# Patient Record
Sex: MALE | Race: WHITE | NOT HISPANIC OR LATINO | URBAN - METROPOLITAN AREA
[De-identification: names, ages, dates, MRNs, and addresses within clinical notes are randomized per-mention and may not be internally consistent; named-entity substitution may affect disease eponyms.]

---

## 2022-05-21 ENCOUNTER — INPATIENT HOSPITAL (OUTPATIENT)
Dept: URBAN - METROPOLITAN AREA HOSPITAL 93 | Facility: HOSPITAL | Age: 72
End: 2022-05-21

## 2022-05-21 DIAGNOSIS — R63.4 ABNORMAL WEIGHT LOSS: ICD-10-CM

## 2022-05-21 DIAGNOSIS — R10.13 EPIGASTRIC PAIN: ICD-10-CM

## 2022-05-21 DIAGNOSIS — D50.9 IRON DEFICIENCY ANEMIA, UNSPECIFIED: ICD-10-CM

## 2022-05-21 PROCEDURE — 99254 IP/OBS CNSLTJ NEW/EST MOD 60: CPT | Performed by: INTERNAL MEDICINE

## 2022-05-22 PROCEDURE — 99232 SBSQ HOSP IP/OBS MODERATE 35: CPT | Performed by: INTERNAL MEDICINE

## 2022-05-23 ENCOUNTER — INPATIENT HOSPITAL (OUTPATIENT)
Dept: URBAN - METROPOLITAN AREA HOSPITAL 93 | Facility: HOSPITAL | Age: 72
End: 2022-05-23

## 2022-05-23 DIAGNOSIS — R10.13 EPIGASTRIC PAIN: ICD-10-CM

## 2022-05-23 DIAGNOSIS — D50.9 IRON DEFICIENCY ANEMIA, UNSPECIFIED: ICD-10-CM

## 2022-05-23 DIAGNOSIS — R63.4 ABNORMAL WEIGHT LOSS: ICD-10-CM

## 2022-05-23 PROCEDURE — 45380 COLONOSCOPY AND BIOPSY: CPT | Performed by: INTERNAL MEDICINE

## 2022-05-23 PROCEDURE — 43239 EGD BIOPSY SINGLE/MULTIPLE: CPT | Mod: 51 | Performed by: INTERNAL MEDICINE

## 2024-03-17 ENCOUNTER — INPATIENT (INPATIENT)
Facility: HOSPITAL | Age: 74
LOS: 0 days | Discharge: LEFT BEFORE DISCHARGE | End: 2024-03-18
Attending: HOSPITALIST | Admitting: HOSPITALIST
Payer: MEDICAID

## 2024-03-17 VITALS
SYSTOLIC BLOOD PRESSURE: 133 MMHG | RESPIRATION RATE: 16 BRPM | TEMPERATURE: 99 F | DIASTOLIC BLOOD PRESSURE: 81 MMHG | WEIGHT: 128.97 LBS | OXYGEN SATURATION: 95 % | HEART RATE: 117 BPM

## 2024-03-17 DIAGNOSIS — I48.91 UNSPECIFIED ATRIAL FIBRILLATION: ICD-10-CM

## 2024-03-17 LAB
ADD ON TEST-SPECIMEN IN LAB: SIGNIFICANT CHANGE UP
ADD ON TEST-SPECIMEN IN LAB: SIGNIFICANT CHANGE UP
ALBUMIN SERPL ELPH-MCNC: 3.6 G/DL — SIGNIFICANT CHANGE UP (ref 3.3–5)
ALP SERPL-CCNC: 83 U/L — SIGNIFICANT CHANGE UP (ref 40–120)
ALT FLD-CCNC: 36 U/L — SIGNIFICANT CHANGE UP (ref 4–41)
ANION GAP SERPL CALC-SCNC: 12 MMOL/L — SIGNIFICANT CHANGE UP (ref 7–14)
ANION GAP SERPL CALC-SCNC: 14 MMOL/L — SIGNIFICANT CHANGE UP (ref 7–14)
APTT BLD: 30.9 SEC — SIGNIFICANT CHANGE UP (ref 24.5–35.6)
AST SERPL-CCNC: 71 U/L — HIGH (ref 4–40)
BASOPHILS # BLD AUTO: 0.02 K/UL — SIGNIFICANT CHANGE UP (ref 0–0.2)
BASOPHILS NFR BLD AUTO: 0.2 % — SIGNIFICANT CHANGE UP (ref 0–2)
BILIRUB SERPL-MCNC: 0.7 MG/DL — SIGNIFICANT CHANGE UP (ref 0.2–1.2)
BUN SERPL-MCNC: 12 MG/DL — SIGNIFICANT CHANGE UP (ref 7–23)
BUN SERPL-MCNC: 13 MG/DL — SIGNIFICANT CHANGE UP (ref 7–23)
CALCIUM SERPL-MCNC: 8.3 MG/DL — LOW (ref 8.4–10.5)
CALCIUM SERPL-MCNC: 9.3 MG/DL — SIGNIFICANT CHANGE UP (ref 8.4–10.5)
CHLORIDE SERPL-SCNC: 102 MMOL/L — SIGNIFICANT CHANGE UP (ref 98–107)
CHLORIDE SERPL-SCNC: 106 MMOL/L — SIGNIFICANT CHANGE UP (ref 98–107)
CO2 SERPL-SCNC: 20 MMOL/L — LOW (ref 22–31)
CO2 SERPL-SCNC: 20 MMOL/L — LOW (ref 22–31)
CREAT SERPL-MCNC: 0.89 MG/DL — SIGNIFICANT CHANGE UP (ref 0.5–1.3)
CREAT SERPL-MCNC: 0.9 MG/DL — SIGNIFICANT CHANGE UP (ref 0.5–1.3)
EGFR: 89 ML/MIN/1.73M2 — SIGNIFICANT CHANGE UP
EGFR: 89 ML/MIN/1.73M2 — SIGNIFICANT CHANGE UP
EOSINOPHIL # BLD AUTO: 0.08 K/UL — SIGNIFICANT CHANGE UP (ref 0–0.5)
EOSINOPHIL NFR BLD AUTO: 1 % — SIGNIFICANT CHANGE UP (ref 0–6)
GLUCOSE SERPL-MCNC: 111 MG/DL — HIGH (ref 70–99)
GLUCOSE SERPL-MCNC: 119 MG/DL — HIGH (ref 70–99)
HCT VFR BLD CALC: 41.2 % — SIGNIFICANT CHANGE UP (ref 39–50)
HGB BLD-MCNC: 13.3 G/DL — SIGNIFICANT CHANGE UP (ref 13–17)
IANC: 5.83 K/UL — SIGNIFICANT CHANGE UP (ref 1.8–7.4)
IMM GRANULOCYTES NFR BLD AUTO: 0.2 % — SIGNIFICANT CHANGE UP (ref 0–0.9)
INR BLD: 1.05 RATIO — SIGNIFICANT CHANGE UP (ref 0.85–1.18)
LYMPHOCYTES # BLD AUTO: 1.46 K/UL — SIGNIFICANT CHANGE UP (ref 1–3.3)
LYMPHOCYTES # BLD AUTO: 17.4 % — SIGNIFICANT CHANGE UP (ref 13–44)
MCHC RBC-ENTMCNC: 26.3 PG — LOW (ref 27–34)
MCHC RBC-ENTMCNC: 32.3 GM/DL — SIGNIFICANT CHANGE UP (ref 32–36)
MCV RBC AUTO: 81.4 FL — SIGNIFICANT CHANGE UP (ref 80–100)
MONOCYTES # BLD AUTO: 0.96 K/UL — HIGH (ref 0–0.9)
MONOCYTES NFR BLD AUTO: 11.5 % — SIGNIFICANT CHANGE UP (ref 2–14)
NEUTROPHILS # BLD AUTO: 5.83 K/UL — SIGNIFICANT CHANGE UP (ref 1.8–7.4)
NEUTROPHILS NFR BLD AUTO: 69.7 % — SIGNIFICANT CHANGE UP (ref 43–77)
NRBC # BLD: 0 /100 WBCS — SIGNIFICANT CHANGE UP (ref 0–0)
NRBC # FLD: 0 K/UL — SIGNIFICANT CHANGE UP (ref 0–0)
NT-PROBNP SERPL-SCNC: 260 PG/ML — SIGNIFICANT CHANGE UP
PLATELET # BLD AUTO: 213 K/UL — SIGNIFICANT CHANGE UP (ref 150–400)
POTASSIUM SERPL-MCNC: 4.1 MMOL/L — SIGNIFICANT CHANGE UP (ref 3.5–5.3)
POTASSIUM SERPL-MCNC: 5.5 MMOL/L — HIGH (ref 3.5–5.3)
POTASSIUM SERPL-SCNC: 4.1 MMOL/L — SIGNIFICANT CHANGE UP (ref 3.5–5.3)
POTASSIUM SERPL-SCNC: 5.5 MMOL/L — HIGH (ref 3.5–5.3)
PROT SERPL-MCNC: 7.8 G/DL — SIGNIFICANT CHANGE UP (ref 6–8.3)
PROTHROM AB SERPL-ACNC: 11.8 SEC — SIGNIFICANT CHANGE UP (ref 9.5–13)
RBC # BLD: 5.06 M/UL — SIGNIFICANT CHANGE UP (ref 4.2–5.8)
RBC # FLD: 15.3 % — HIGH (ref 10.3–14.5)
SODIUM SERPL-SCNC: 136 MMOL/L — SIGNIFICANT CHANGE UP (ref 135–145)
SODIUM SERPL-SCNC: 138 MMOL/L — SIGNIFICANT CHANGE UP (ref 135–145)
TROPONIN T, HIGH SENSITIVITY RESULT: 15 NG/L — SIGNIFICANT CHANGE UP
TROPONIN T, HIGH SENSITIVITY RESULT: 15 NG/L — SIGNIFICANT CHANGE UP
WBC # BLD: 8.37 K/UL — SIGNIFICANT CHANGE UP (ref 3.8–10.5)
WBC # FLD AUTO: 8.37 K/UL — SIGNIFICANT CHANGE UP (ref 3.8–10.5)

## 2024-03-17 PROCEDURE — 71275 CT ANGIOGRAPHY CHEST: CPT | Mod: 26,MC

## 2024-03-17 PROCEDURE — 74174 CTA ABD&PLVS W/CONTRAST: CPT | Mod: 26,MC

## 2024-03-17 PROCEDURE — 99223 1ST HOSP IP/OBS HIGH 75: CPT

## 2024-03-17 PROCEDURE — 71046 X-RAY EXAM CHEST 2 VIEWS: CPT | Mod: 26

## 2024-03-17 PROCEDURE — 99285 EMERGENCY DEPT VISIT HI MDM: CPT

## 2024-03-17 RX ORDER — SODIUM CHLORIDE 9 MG/ML
1000 INJECTION INTRAMUSCULAR; INTRAVENOUS; SUBCUTANEOUS ONCE
Refills: 0 | Status: COMPLETED | OUTPATIENT
Start: 2024-03-17 | End: 2024-03-17

## 2024-03-17 RX ORDER — ACETAMINOPHEN 500 MG
1000 TABLET ORAL ONCE
Refills: 0 | Status: COMPLETED | OUTPATIENT
Start: 2024-03-17 | End: 2024-03-17

## 2024-03-17 RX ORDER — ASPIRIN/CALCIUM CARB/MAGNESIUM 324 MG
324 TABLET ORAL ONCE
Refills: 0 | Status: COMPLETED | OUTPATIENT
Start: 2024-03-17 | End: 2024-03-17

## 2024-03-17 RX ORDER — FAMOTIDINE 10 MG/ML
20 INJECTION INTRAVENOUS ONCE
Refills: 0 | Status: COMPLETED | OUTPATIENT
Start: 2024-03-17 | End: 2024-03-17

## 2024-03-17 RX ADMIN — Medication 1000 MILLIGRAM(S): at 17:03

## 2024-03-17 RX ADMIN — FAMOTIDINE 20 MILLIGRAM(S): 10 INJECTION INTRAVENOUS at 19:27

## 2024-03-17 RX ADMIN — Medication 400 MILLIGRAM(S): at 19:28

## 2024-03-17 RX ADMIN — SODIUM CHLORIDE 1000 MILLILITER(S): 9 INJECTION INTRAMUSCULAR; INTRAVENOUS; SUBCUTANEOUS at 17:15

## 2024-03-17 RX ADMIN — Medication 324 MILLIGRAM(S): at 18:47

## 2024-03-17 NOTE — ED PROVIDER NOTE - CARE PLAN
Principal Discharge DX:	New onset atrial fibrillation  Secondary Diagnosis:	Chest pain  Secondary Diagnosis:	Shortness of breath   1

## 2024-03-17 NOTE — ED PROVIDER NOTE - CLINICAL SUMMARY MEDICAL DECISION MAKING FREE TEXT BOX
73-year-old male with past medical history emphysema, PUD, HLD, BPH presenting to the ER with chest pain and shortness of breath.  Patient's daughter-in-law is providing Urdu translation at bedside.  States last night patient developed left-sided chest pain with radiation to the back associated with shortness of breath.  On exam pt is well appearing,  bpm at triage, EKG with afib. Concern for aortic dissection, ACS. Pt with new onset a-fib. Plan: cbc/cmp, trop, bnp, CTa chest/abd/pelvis, cardiac monitor. 73-year-old male with past medical history emphysema, PUD, HLD, BPH presenting to the ER with chest pain and shortness of breath.  Patient's daughter-in-law is providing Danish translation at bedside.  States last night patient developed left-sided chest pain with radiation to the back associated with shortness of breath.  On exam pt is well appearing,  bpm at triage, EKG with afib. Concern for aortic dissection, ACS. Pt with new onset a-fib, HR  on cardiac monitor. Plan: cbc/cmp, trop, bnp, CTa chest/abd/pelvis, cardiac monitor.

## 2024-03-17 NOTE — ED ADULT NURSE REASSESSMENT NOTE - NS ED NURSE REASSESS COMMENT FT1
Pt is a&ox4, coherent, breathing spontaneously. Pt is fed and PO fluid offered by family. VS appreciated, HR 70. Pt is admitted pending bed assignment.
Pt is A&Ox4, coherent, breathing spontaneously, symmetrical unlabored. Family at bedside. Pt report partial relief of chest pain. Pt pending radiology result. Pt used urinal at bed side with standing assist x1. Plan of care in progress.

## 2024-03-17 NOTE — ED PROVIDER NOTE - IV ALTEPLASE INCLUSION HIDDEN
Outpatient Oncology Care Plan  Problem list:  knowledge deficit    Problems related to:    disease/disease progression    Interventions:  provided general teaching    Expected outcomes:  understands plan of care    Progress towards outcome:  making progres
show

## 2024-03-17 NOTE — ED ADULT TRIAGE NOTE - GLASGOW COMA SCALE: BEST MOTOR RESPONSE, MLM
Diagnosed with autism    Has had vomiting for a while  Had in the past wondered about EE.  Cyclic vomiting?  Will refer to GI   (M6) obeys commands

## 2024-03-17 NOTE — ED PROVIDER NOTE - OBJECTIVE STATEMENT
73-year-old male with past medical history emphysema, PUD, HLD, BPH presenting to the ER with chest pain and shortness of breath.  Patient's daughter-in-law is providing Korean translation at bedside.  States last night patient developed left-sided chest pain with radiation to the back associated with shortness of breath.  Patient reports he feels uncomfortable with exertion, unable to describe further.  Denies fever/chills, headache, dizziness, abdominal pain, N/V/D, diaphoresis, leg pain or swelling, recent travel or illness or any other concerns. 73-year-old male with past medical history emphysema, PUD, HLD, BPH presenting to the ER with chest pain and shortness of breath.  Patient's daughter-in-law is providing Pashto translation at bedside.  States last night patient developed left-sided chest pain with radiation to the back associated with shortness of breath.  Patient reports he feels uncomfortable with exertion, unable to describe further.  Of note pt was started on Azithromycin last week for cough, pt and family state he has a chronic cough, was not worsening. He followed up with his pulmonologist 2 days ago and was started on Trelegy. Denies fever/chills, headache, dizziness, abdominal pain, N/V/D, diaphoresis, leg pain or swelling, recent travel or illness or any other concerns.

## 2024-03-17 NOTE — ED ADULT NURSE REASSESSMENT NOTE - PAIN: RESPONSE TO INTERVENTIONS
Refill request for simvastatin routed to MD for approval.   Lipids done 2/02/22 -   Will be sent to [facility] when approved.   partial relief

## 2024-03-17 NOTE — ED PROVIDER NOTE - PROGRESS NOTE DETAILS
KAMRYN Kaufman: CTa without evidence of dissection, trop 15, rpt sent, K 5.5 but severely hemolyzed, rpt BMP sent. Pt reports improvement in symptoms, HR  on monitor in afib. Spoke with tele doc who accepts pt, pt and family aware of admission

## 2024-03-17 NOTE — ED ADULT NURSE NOTE - NSFALLRISKINTERV_ED_ALL_ED
Assistance OOB with selected safe patient handling equipment if applicable/Communicate fall risk and risk factors to all staff, patient, and family/Provide visual cue: yellow wristband, yellow gown, etc/Reinforce activity limits and safety measures with patient and family/Toileting schedule using arm’s reach rule for commode and bathroom/Call bell, personal items and telephone in reach/Instruct patient to call for assistance before getting out of bed/chair/stretcher/Non-slip footwear applied when patient is off stretcher/[place] to call system/Physically safe environment - no spills, clutter or unnecessary equipment/Purposeful Proactive Rounding/Room/bathroom lighting operational, light cord in reach

## 2024-03-17 NOTE — ED ADULT NURSE NOTE - OBJECTIVE STATEMENT
Pt received in bed coherent, breathing spontaneously, symmetrical, unlabored. Pt arrived ambulatory to ED accompanied by daughter for c/o SOB and chest pain 10/10 that radiates to back started last night. Pt denies fever, N/V. Pt with newly onset of Afib not currently on medication. Pt with Hx: emphysema. Labs drawn and sent pending result. 20 GIV PLACED IN RIGHT FOREARM. IV bolus fluid in progress. Plan of care in progress.

## 2024-03-17 NOTE — ED PROVIDER NOTE - ATTENDING APP SHARED VISIT CONTRIBUTION OF CARE
74 yo M hx HTN, HLD, PUD, BPH, presenting with complaints of chest pain, described as sharp and constant, that started last night. Pain worsened today, now radiating to the back. Denies associated nausea/vomiting or diaphoresis. No abdominal pain. No cough/fevers/chills. Denies focal weakness, numbness or tingling, but with generalized weakness. No history of similar symptoms in the past.    VITALS: reviewed  GEN: NAD, A & O x 4  HEAD/EYES: NCAT, EOMI, anicteric sclerae,   ENT: mucus membranes moist, oropharynx WNL, trachea midline,  RESP: lungs CTA with equal breath sounds bilaterally, chest wall nontender and atraumatic  CV: heart with reg rhythm S1, S2, distal pulses intact and symmetric bilaterally  ABDOMEN: normoactive bowel sounds, soft, nondistended, nontender, no palpable masses  : no CVAT  MSK: extremities atraumatic and nontender, no edema, no asymmetry.  SKIN: warm, dry, no rash, no bruising, no cyanosis. color appropriate for ethnicity  NEURO: alert, mentating appropriately, no facial asymmetry.   PSYCH: Affect appropriate    EKG shows AF, no known history, no AD. CP radiating to back, will obtain CTA r/o PE, labs with troponin r/o ACS, lipase r/o pancreatitis, will monitor and tele and tba

## 2024-03-18 VITALS
TEMPERATURE: 97 F | RESPIRATION RATE: 20 BRPM | SYSTOLIC BLOOD PRESSURE: 128 MMHG | DIASTOLIC BLOOD PRESSURE: 65 MMHG | OXYGEN SATURATION: 100 % | HEART RATE: 71 BPM

## 2024-03-18 DIAGNOSIS — R07.9 CHEST PAIN, UNSPECIFIED: ICD-10-CM

## 2024-03-18 DIAGNOSIS — K27.9 PEPTIC ULCER, SITE UNSPECIFIED, UNSPECIFIED AS ACUTE OR CHRONIC, WITHOUT HEMORRHAGE OR PERFORATION: ICD-10-CM

## 2024-03-18 DIAGNOSIS — I48.0 PAROXYSMAL ATRIAL FIBRILLATION: ICD-10-CM

## 2024-03-18 DIAGNOSIS — Z29.9 ENCOUNTER FOR PROPHYLACTIC MEASURES, UNSPECIFIED: ICD-10-CM

## 2024-03-18 DIAGNOSIS — R74.01 ELEVATION OF LEVELS OF LIVER TRANSAMINASE LEVELS: ICD-10-CM

## 2024-03-18 DIAGNOSIS — E78.5 HYPERLIPIDEMIA, UNSPECIFIED: ICD-10-CM

## 2024-03-18 DIAGNOSIS — J43.9 EMPHYSEMA, UNSPECIFIED: ICD-10-CM

## 2024-03-18 LAB
A1C WITH ESTIMATED AVERAGE GLUCOSE RESULT: 5.3 % — SIGNIFICANT CHANGE UP (ref 4–5.6)
ALBUMIN SERPL ELPH-MCNC: 3.5 G/DL — SIGNIFICANT CHANGE UP (ref 3.3–5)
ALP SERPL-CCNC: 81 U/L — SIGNIFICANT CHANGE UP (ref 40–120)
ALT FLD-CCNC: 28 U/L — SIGNIFICANT CHANGE UP (ref 4–41)
ANION GAP SERPL CALC-SCNC: 10 MMOL/L — SIGNIFICANT CHANGE UP (ref 7–14)
APTT BLD: 32 SEC — SIGNIFICANT CHANGE UP (ref 24.5–35.6)
AST SERPL-CCNC: 31 U/L — SIGNIFICANT CHANGE UP (ref 4–40)
BASOPHILS # BLD AUTO: 0.03 K/UL — SIGNIFICANT CHANGE UP (ref 0–0.2)
BASOPHILS NFR BLD AUTO: 0.4 % — SIGNIFICANT CHANGE UP (ref 0–2)
BILIRUB SERPL-MCNC: 0.7 MG/DL — SIGNIFICANT CHANGE UP (ref 0.2–1.2)
BUN SERPL-MCNC: 16 MG/DL — SIGNIFICANT CHANGE UP (ref 7–23)
CALCIUM SERPL-MCNC: 8.4 MG/DL — SIGNIFICANT CHANGE UP (ref 8.4–10.5)
CHLORIDE SERPL-SCNC: 109 MMOL/L — HIGH (ref 98–107)
CHOLEST SERPL-MCNC: 84 MG/DL — SIGNIFICANT CHANGE UP
CO2 SERPL-SCNC: 21 MMOL/L — LOW (ref 22–31)
CREAT SERPL-MCNC: 1.08 MG/DL — SIGNIFICANT CHANGE UP (ref 0.5–1.3)
EGFR: 72 ML/MIN/1.73M2 — SIGNIFICANT CHANGE UP
EOSINOPHIL # BLD AUTO: 0.16 K/UL — SIGNIFICANT CHANGE UP (ref 0–0.5)
EOSINOPHIL NFR BLD AUTO: 2 % — SIGNIFICANT CHANGE UP (ref 0–6)
ESTIMATED AVERAGE GLUCOSE: 105 — SIGNIFICANT CHANGE UP
GLUCOSE SERPL-MCNC: 95 MG/DL — SIGNIFICANT CHANGE UP (ref 70–99)
HBV CORE AB SER-ACNC: REACTIVE
HBV SURFACE AB SER-ACNC: REACTIVE
HCT VFR BLD CALC: 38.5 % — LOW (ref 39–50)
HCV AB S/CO SERPL IA: 0.1 S/CO — SIGNIFICANT CHANGE UP (ref 0–0.99)
HCV AB SERPL-IMP: SIGNIFICANT CHANGE UP
HDLC SERPL-MCNC: 29 MG/DL — LOW
HGB BLD-MCNC: 12.5 G/DL — LOW (ref 13–17)
IANC: 5.17 K/UL — SIGNIFICANT CHANGE UP (ref 1.8–7.4)
IMM GRANULOCYTES NFR BLD AUTO: 0.2 % — SIGNIFICANT CHANGE UP (ref 0–0.9)
INR BLD: 1.12 RATIO — SIGNIFICANT CHANGE UP (ref 0.85–1.18)
LIPID PNL WITH DIRECT LDL SERPL: 33 MG/DL — SIGNIFICANT CHANGE UP
LYMPHOCYTES # BLD AUTO: 1.74 K/UL — SIGNIFICANT CHANGE UP (ref 1–3.3)
LYMPHOCYTES # BLD AUTO: 21.5 % — SIGNIFICANT CHANGE UP (ref 13–44)
MCHC RBC-ENTMCNC: 26.6 PG — LOW (ref 27–34)
MCHC RBC-ENTMCNC: 32.5 GM/DL — SIGNIFICANT CHANGE UP (ref 32–36)
MCV RBC AUTO: 81.9 FL — SIGNIFICANT CHANGE UP (ref 80–100)
MONOCYTES # BLD AUTO: 0.96 K/UL — HIGH (ref 0–0.9)
MONOCYTES NFR BLD AUTO: 11.9 % — SIGNIFICANT CHANGE UP (ref 2–14)
NEUTROPHILS # BLD AUTO: 5.17 K/UL — SIGNIFICANT CHANGE UP (ref 1.8–7.4)
NEUTROPHILS NFR BLD AUTO: 64 % — SIGNIFICANT CHANGE UP (ref 43–77)
NON HDL CHOLESTEROL: 55 MG/DL — SIGNIFICANT CHANGE UP
NRBC # BLD: 0 /100 WBCS — SIGNIFICANT CHANGE UP (ref 0–0)
NRBC # FLD: 0 K/UL — SIGNIFICANT CHANGE UP (ref 0–0)
NT-PROBNP SERPL-SCNC: 312 PG/ML — HIGH
PLATELET # BLD AUTO: 202 K/UL — SIGNIFICANT CHANGE UP (ref 150–400)
POTASSIUM SERPL-MCNC: 4.1 MMOL/L — SIGNIFICANT CHANGE UP (ref 3.5–5.3)
POTASSIUM SERPL-SCNC: 4.1 MMOL/L — SIGNIFICANT CHANGE UP (ref 3.5–5.3)
PROT SERPL-MCNC: 7 G/DL — SIGNIFICANT CHANGE UP (ref 6–8.3)
PROTHROM AB SERPL-ACNC: 12.5 SEC — SIGNIFICANT CHANGE UP (ref 9.5–13)
RBC # BLD: 4.7 M/UL — SIGNIFICANT CHANGE UP (ref 4.2–5.8)
RBC # FLD: 14.9 % — HIGH (ref 10.3–14.5)
SODIUM SERPL-SCNC: 140 MMOL/L — SIGNIFICANT CHANGE UP (ref 135–145)
TRIGL SERPL-MCNC: 111 MG/DL — SIGNIFICANT CHANGE UP
WBC # BLD: 8.08 K/UL — SIGNIFICANT CHANGE UP (ref 3.8–10.5)
WBC # FLD AUTO: 8.08 K/UL — SIGNIFICANT CHANGE UP (ref 3.8–10.5)

## 2024-03-18 PROCEDURE — 93306 TTE W/DOPPLER COMPLETE: CPT | Mod: 26

## 2024-03-18 PROCEDURE — 76700 US EXAM ABDOM COMPLETE: CPT | Mod: 26

## 2024-03-18 RX ORDER — ATORVASTATIN CALCIUM 80 MG/1
1 TABLET, FILM COATED ORAL
Refills: 0 | DISCHARGE

## 2024-03-18 RX ORDER — ACETAMINOPHEN 500 MG
650 TABLET ORAL EVERY 8 HOURS
Refills: 0 | Status: DISCONTINUED | OUTPATIENT
Start: 2024-03-18 | End: 2024-03-18

## 2024-03-18 RX ORDER — HEPARIN SODIUM 5000 [USP'U]/ML
5000 INJECTION INTRAVENOUS; SUBCUTANEOUS EVERY 8 HOURS
Refills: 0 | Status: DISCONTINUED | OUTPATIENT
Start: 2024-03-18 | End: 2024-03-18

## 2024-03-18 RX ORDER — PANTOPRAZOLE SODIUM 20 MG/1
1 TABLET, DELAYED RELEASE ORAL
Refills: 0 | DISCHARGE

## 2024-03-18 RX ORDER — NYSTATIN 500MM UNIT
400000 POWDER (EA) MISCELLANEOUS
Refills: 0 | Status: DISCONTINUED | OUTPATIENT
Start: 2024-03-18 | End: 2024-03-18

## 2024-03-18 RX ORDER — NYSTATIN 500MM UNIT
4 POWDER (EA) MISCELLANEOUS
Refills: 0 | DISCHARGE

## 2024-03-18 RX ORDER — IPRATROPIUM/ALBUTEROL SULFATE 18-103MCG
3 AEROSOL WITH ADAPTER (GRAM) INHALATION EVERY 6 HOURS
Refills: 0 | Status: DISCONTINUED | OUTPATIENT
Start: 2024-03-18 | End: 2024-03-18

## 2024-03-18 RX ORDER — ATORVASTATIN CALCIUM 80 MG/1
10 TABLET, FILM COATED ORAL AT BEDTIME
Refills: 0 | Status: DISCONTINUED | OUTPATIENT
Start: 2024-03-18 | End: 2024-03-18

## 2024-03-18 RX ORDER — APIXABAN 2.5 MG/1
1 TABLET, FILM COATED ORAL
Qty: 60 | Refills: 0
Start: 2024-03-18 | End: 2024-04-16

## 2024-03-18 RX ORDER — FLUTICASONE FUROATE, UMECLIDINIUM BROMIDE AND VILANTEROL TRIFENATATE 200; 62.5; 25 UG/1; UG/1; UG/1
1 POWDER RESPIRATORY (INHALATION)
Refills: 0 | DISCHARGE

## 2024-03-18 RX ORDER — BUDESONIDE AND FORMOTEROL FUMARATE DIHYDRATE 160; 4.5 UG/1; UG/1
2 AEROSOL RESPIRATORY (INHALATION)
Refills: 0 | Status: DISCONTINUED | OUTPATIENT
Start: 2024-03-18 | End: 2024-03-18

## 2024-03-18 RX ORDER — LANOLIN ALCOHOL/MO/W.PET/CERES
3 CREAM (GRAM) TOPICAL AT BEDTIME
Refills: 0 | Status: DISCONTINUED | OUTPATIENT
Start: 2024-03-18 | End: 2024-03-18

## 2024-03-18 RX ORDER — PANTOPRAZOLE SODIUM 20 MG/1
40 TABLET, DELAYED RELEASE ORAL
Refills: 0 | Status: DISCONTINUED | OUTPATIENT
Start: 2024-03-18 | End: 2024-03-18

## 2024-03-18 RX ORDER — LANOLIN ALCOHOL/MO/W.PET/CERES
1 CREAM (GRAM) TOPICAL
Qty: 0 | Refills: 0 | DISCHARGE
Start: 2024-03-18

## 2024-03-18 RX ADMIN — Medication 400000 UNIT(S): at 06:01

## 2024-03-18 RX ADMIN — HEPARIN SODIUM 5000 UNIT(S): 5000 INJECTION INTRAVENOUS; SUBCUTANEOUS at 06:01

## 2024-03-18 RX ADMIN — HEPARIN SODIUM 5000 UNIT(S): 5000 INJECTION INTRAVENOUS; SUBCUTANEOUS at 16:38

## 2024-03-18 RX ADMIN — Medication 3 MILLILITER(S): at 06:01

## 2024-03-18 RX ADMIN — Medication 3 MILLILITER(S): at 12:29

## 2024-03-18 RX ADMIN — PANTOPRAZOLE SODIUM 40 MILLIGRAM(S): 20 TABLET, DELAYED RELEASE ORAL at 06:01

## 2024-03-18 NOTE — H&P ADULT - PROBLEM SELECTOR PLAN 3
AST/ALT 71/36  -ABD US ordered to evaluate for liver path/ascites   -hep B and C panel ordered in AM  -CT abdomen and pelvis with evidence of hepatic steatosis (which the patient has a history of).   -continue to monitor CMP daily

## 2024-03-18 NOTE — H&P ADULT - NSICDXPASTSURGICALHX_GEN_ALL_CORE_FT
Patient: Siena Rea    Procedure Summary     Date:  05/08/18 Room / Location:  [facility] ENDOSCOPY 6 / [facility] ENDOSCOPY    Anesthesia Start:  1001 Anesthesia Stop:  1012    Procedure:  ESOPHAGOGASTRODUODENOSCOPY WITH ANESTHESIA (N/A Esophagus) Diagnosis:       Gastroesophageal reflux disease, esophagitis presence not specified      Gastritis      (Gastroesophageal reflux disease, esophagitis presence not specified [K21.9])    Surgeon:  Kin Correia MD Provider:  Carlos Alberto Mays CRNA    Anesthesia Type:  general ASA Status:  3          Anesthesia Type: general  Last vitals  BP   124/59 (05/08/18 1015)   Temp   97.7 °F (36.5 °C) (05/08/18 0845)   Pulse   72 (05/08/18 0845)   Resp   20 (05/08/18 0845)     SpO2   94 % (05/08/18 1015)     Post Anesthesia Care and Evaluation    Patient location during evaluation: PHASE II  Patient participation: complete - patient participated  Level of consciousness: awake  Pain management: adequate  Airway patency: patent  Anesthetic complications: No anesthetic complications  Respiratory status: acceptable  Hydration status: acceptable       PAST SURGICAL HISTORY:  No significant past surgical history

## 2024-03-18 NOTE — DISCHARGE NOTE PROVIDER - NSDCMRMEDTOKEN_GEN_ALL_CORE_FT
atorvastatin 10 mg oral tablet: 1 tab(s) orally once a day (at bedtime)  Eliquis 5 mg oral tablet: 1 tab(s) orally 2 times a day  melatonin 3 mg oral tablet: 1 tab(s) orally once a day (at bedtime) As needed Insomnia  nystatin 100,000 units/mL oral suspension: 4 milliliter(s) orally 2 times a day  Protonix 20 mg oral delayed release tablet: 1 tab(s) orally 2 times a day  Trelegy Ellipta 200 mcg-62.5 mcg-25 mcg/inh inhalation powder: 1 puff(s) inhaled once a day

## 2024-03-18 NOTE — DISCHARGE NOTE PROVIDER - NSDCCPCAREPLAN_GEN_ALL_CORE_FT
PRINCIPAL DISCHARGE DIAGNOSIS  Diagnosis: New onset atrial fibrillation  Assessment and Plan of Treatment: Take Eliquis 5 mg 2 x day, monitor for signs of bleeding and notify PCP if develop blood in stool or urine      SECONDARY DISCHARGE DIAGNOSES  Diagnosis: Chest pain  Assessment and Plan of Treatment: Echo unrenarkable  You declined Strees test, if develop chest daniel, palpitation return to ER    Diagnosis: Transaminitis  Assessment and Plan of Treatment: Abd US showed: Fatty infiltration of the liver-2.1 cm left renal cyst. follow up as out pt    Diagnosis: Shortness of breath  Assessment and Plan of Treatment: resolved

## 2024-03-18 NOTE — H&P ADULT - NSHPPHYSICALEXAM_GEN_ALL_CORE
Vital Signs Last 24 Hrs  T(C): 36.7 (18 Mar 2024 01:30), Max: 37 (17 Mar 2024 14:50)  T(F): 98 (18 Mar 2024 01:30), Max: 98.6 (17 Mar 2024 14:50)  HR: 64 (18 Mar 2024 01:30) (64 - 117)  BP: 104/60 (18 Mar 2024 01:30) (104/60 - 133/81)  BP(mean): --  RR: 19 (18 Mar 2024 01:30) (16 - 21)  SpO2: 98% (18 Mar 2024 01:30) (95% - 99%)    Parameters below as of 18 Mar 2024 01:30  Patient On (Oxygen Delivery Method): room air        CONSTITUTIONAL: Well-groomed, in no apparent distress  EYES: No conjunctival or scleral injection, non-icteric; PERRLA and symmetric  ENMT: No external nasal lesions; nasal mucosa not inflamed; edentulous, oral mucosa with moist membranes  NECK: Trachea midline without palpable neck mass; thyroid not enlarged and non-tender  RESPIRATORY: Breathing comfortably; lungs CTA without wheeze/rhonchi/rales  CARDIOVASCULAR: +S1S2, RRR, no M/G/R;  no lower extremity edema bilaterally  GASTROINTESTINAL: No palpable masses or tenderness, +BS throughout, no rebound/guarding; no hepatosplenomegaly; no hernia palpated, mild-to moderate distention   MUSCULOSKELETAL:  no digital clubbing or cyanosis; normal strength and tone of extremities  SKIN: No rashes or ulcers noted; no subcutaneous nodules or induration palpable  NEUROLOGIC: CN II-XII intact;  sensation intact in LEs b/l to light touch  PSYCHIATRIC: A+O x 3; mood and affect appropriate; appropriate insight and judgment

## 2024-03-18 NOTE — H&P ADULT - PROBLEM SELECTOR PLAN 1
afib with RVR to 100-140s, not sustained  -did not need medication to get it down   -CHADs vasc 2 points- stroke risk was 2.2% per year in >90,000 patients and 2.9% risk of stroke/TIA/systemic embolism   -EP consult in the AM per primary team  -risk/benefit conversation for AC in the setting of PUD to take place in the AM  -pro-  -no signs of volume overload on exam.  -CT with cardiomegaly.

## 2024-03-18 NOTE — CONSULT NOTE ADULT - NS ATTEND AMEND GEN_ALL_CORE FT
Patient seen and examined. Agree with plan as detailed in PA/NP Note.     Patient and daughter leaving AMA, explained consequence of incomplete w/u including death. They understand risk, patient will sign out AMA    Dave Lin MD  Pager: [phone]

## 2024-03-18 NOTE — DISCHARGE NOTE PROVIDER - HOSPITAL COURSE
he aortic valve appears trileaflet with normal systolic excursion. There is calcification of the aortic valve leaflets. There is mild aortic regurgitation.    73 year old M with history of emphysema PUD, HLD, BPH presents to the ED with left sided chest pain and associated shortness of breath for the past 1 day    -ACs ruled out  --TTE with Normal LV function  -Pt refused  NST  -given new PAF, pt with elevated [id number], recommend lifelong AC.  Start Eliquis 5 BID      Abd US showed: Fatty infiltration of the liver.2.1 cm left renal cyst.    PT refused ST, has a flight in AM, Explained the risk for signing AMA

## 2024-03-18 NOTE — H&P ADULT - PROBLEM SELECTOR PLAN 5
-at home on trelegy 200 mcg 1 puff daily   -will do duoneb BID and albuterol Q6hr PRN for shortness of breath or wheezing   -continuous pulse oximetry  -CT with calcified granulomas and biapical scarring.

## 2024-03-18 NOTE — H&P ADULT - HISTORY OF PRESENT ILLNESS
Mr. Burden is a 73 year old M with history of emphesyma, PUD, HLD, BPH presents to the ED with left sided chest pain and associated shortness of breath for the past 1 day, started night of 03/16/2024.  Mr. Burden is a 73 year old M with history of emphysema PUD, HLD, BPH presents to the ED with left sided chest pain and associated shortness of breath for the past 1 day, started night of 03/16/2024. He reports left sided chest pain that radiates to the back, needle like in quality, associated with shortness of breath and worsens with exertion. He reports it started last night. He reports last week he took a 4 day course of azithromycin. He also reports at baseline he has YOUNGBLOOD secondary to his emphysema and also reports 2 days ago he was started on a new inhaler trelegy by his pulmonologist. He reports intermittent orthopnea. He denies any associated fever, chills, headache, dizziness, nausea, vomiting, abdominal pain, diaphoresis, numbness, LE edema, recent travel or illness. His daughter in law reports he had some abdominal pain/"feels like gas: prior to the start of his left chest pain. He also has been more constipated over the last few days. CTA abdomen and pelvis shows no intraluminal hematoma or AD, hepatic steatosis, cardiomegaly, no pericardial effusions, calcified granulomas. Labs were sig for the following: , Ca 8.3, BUN/Cr 12/0.89, AST/ALT 71/36 and trop 15->15 and trop 260. EKG as interpreted by me shows the following: , afib with RVR, QRS 86 ms, QTc 441 ms, no ST/T changes.       He also has been taking nystatin suspension BID for the last 4 days and has 6 more days of treatment. He had a EGD 1 year ago which showed gastric ulcers.

## 2024-03-18 NOTE — DISCHARGE NOTE NURSING/CASE MANAGEMENT/SOCIAL WORK - PATIENT PORTAL LINK FT
You can access the FollowMyHealth Patient Portal offered by [facility] by registering at the following website: [email or web address]. By joining [email or web address]’s FollowMyHealth portal, you will also be able to view your health information using other applications (apps) compatible with our system.

## 2024-03-18 NOTE — H&P ADULT - NSICDXPASTMEDICALHX_GEN_ALL_CORE_FT
PAST MEDICAL HISTORY:  BPH (benign prostatic hyperplasia)     Emphysema/COPD     HLD (hyperlipidemia)     PUD (peptic ulcer disease)

## 2024-03-18 NOTE — H&P ADULT - NSHPSOCIALHISTORY_GEN_ALL_CORE
Denies smoking, alcohol or drugs. Lives with daughter in law.     Has food allergies-that are unknown, no drug allergies.

## 2024-03-18 NOTE — H&P ADULT - NSHPREVIEWOFSYSTEMS_GEN_ALL_CORE
Review of Systems:   CONSTITUTIONAL: No fever  EYES:  no vision changes  ENMT:  No difficulty hearing  RESPIRATORY: No SOB. +cough (chronic), no wheezing, no chills  CARDIOVASCULAR: +chest pain, no dizziness, no leg swelling  GASTROINTESTINAL: +abdominal pain-gaseous sensation. No nausea, no vomiting. No diarrhea. +constipation. No melena or hematochezia.  GENITOURINARY: No dysuria, no urinary frequency  NEUROLOGICAL: No headaches, no numbness  SKIN: No itching  ENDOCRINE: No heat or cold intolerance; No hair loss  MUSCULOSKELETAL: No joint pain. No muscle, +chest pain radiating to back pain- no current back pain  PSYCHIATRIC: No depression

## 2024-03-18 NOTE — H&P ADULT - PROBLEM SELECTOR PLAN 2
-exertional chest pain concerning for ACS, likely will need ischemic eval   -trop 15->15  -TTE in AM   -cardiac monitoring   -cards consult in the AM per primary team

## 2024-03-18 NOTE — H&P ADULT - ASSESSMENT
Mr. Burden is a 73 year old M with history of emphysema PUD, HLD, BPH presents to the ED with exertional left sided chest pain radiating to back and associated shortness of breath for the past 1 day, started night of 03/16/2024.

## 2024-03-18 NOTE — H&P ADULT - NSHPLABSRESULTS_GEN_ALL_CORE
13.3   8.37  )-----------( 213      ( 17 Mar 2024 16:45 )             41.2     138  |  106  |  12  ----------------------------<  119<H>     03-17  4.1   |  20<L>  |  0.89    Ca    8.3<L>      17 Mar 2024 18:48  Phos  4.2     03-17  Mg     2.10     03-17    TPro  7.8  /  Alb  3.6  /  TBili  0.7  /  DBili  x   /  AST  71<H>  /  ALT  36  /  AlkPhos  83  03-17    PT/INR: 11.8/1.05 (03-17-24 @ 16:45)  PTT: 30.9 (03-17-24 @ 16:45)              hs Troponin, T - 15 ng/L (03-17-24 @ 18:48)  hs Troponin, T - 15 ng/L (03-17-24 @ 16:45)      Pro-BNP: 260 (03-17-24 @ 16:45)

## 2024-03-18 NOTE — H&P ADULT - PROBLEM SELECTOR PLAN 7
DVT prophylaxis: hep 5000 units every 8 hours  Diet: cardiac diet, soft diet, halal   GI prophylaxis: PPI as above   Full code and daughter in law is proxy.

## 2024-03-18 NOTE — CONSULT NOTE ADULT - SUBJECTIVE AND OBJECTIVE BOX
HISTORY OF PRESENT ILLNESS: HPI:  Mr. Burden is a 73 year old M with history of emphysema PUD, HLD, BPH presents to the ED with left sided chest pain and associated shortness of breath for the past 1 day, started night of 03/16/2024. He reports left sided chest pain that radiates to the back, needle like in quality, associated with shortness of breath and worsens with exertion. He reports it started last night. He reports last week he took a 4 day course of azithromycin. He also reports at baseline he has YOUNGBLOOD secondary to his emphysema and also reports 2 days ago he was started on a new inhaler trelegy by his pulmonologist. He reports intermittent orthopnea. He denies any associated fever, chills, headache, dizziness, nausea, vomiting, abdominal pain, diaphoresis, numbness, LE edema, recent travel or illness. His daughter in law reports he had some abdominal pain/"feels like gas: prior to the start of his left chest pain. He also has been more constipated over the last few days. CTA abdomen and pelvis shows no intraluminal hematoma or AD, hepatic steatosis, cardiomegaly, no pericardial effusions, calcified granulomas. Labs were sig for the following: , Ca 8.3, BUN/Cr 12/0.89, AST/ALT 71/36 and trop 15->15 and trop 260. EKG as interpreted by me shows the following: , afib with RVR, QRS 86 ms, QTc 441 ms, no ST/T changes.       He also has been taking nystatin suspension BID for the last 4 days and has 6 more days of treatment. He had a EGD 1 year ago which showed gastric ulcers.  (18 Mar 2024 00:04)      PAST MEDICAL & SURGICAL HISTORY:  PUD (peptic ulcer disease)      HLD (hyperlipidemia)      BPH (benign prostatic hyperplasia)      Emphysema/COPD      No significant past surgical history      MEDICATIONS  (STANDING):  albuterol/ipratropium for Nebulization 3 milliLiter(s) Nebulizer every 6 hours  atorvastatin 10 milliGRAM(s) Oral at bedtime  budesonide 160 MICROgram(s)/formoterol 4.5 MICROgram(s) Inhaler 2 Puff(s) Inhalation two times a day  heparin   Injectable 5000 Unit(s) SubCutaneous every 8 hours  nystatin    Suspension 736940 Unit(s) Oral two times a day  pantoprazole    Tablet 40 milliGRAM(s) Oral before breakfast      Allergies  No Known Allergies    FAMILY HISTORY:  No pertinent family history in first degree relatives      Non-contributary for premature coronary disease or sudden cardiac death    SOCIAL HISTORY:    [ x] Non-smoker  [ ] Smoker  [ ] Alcohol    FLU VACCINE THIS YEAR STARTS IN AUGUST:  [ ] Yes    [ ] No    IF OVER 65 HAVE YOU EVER HAD A PNA VACCINE:  [ ] Yes    [ ] No       [ ] N/A      REVIEW OF SYSTEMS:  [ ]chest pain  [  ]shortness of breath  [  ]palpitations  [  ]syncope  [ ]near syncope [ ]upper extremity weakness   [ ] lower extremity weakness  [  ]diplopia  [  ]altered mental status   [  ]fevers  [ ]chills [ ]nausea  [ ]vomiting  [  ]dysphagia    [ ]abdominal pain  [ ]melena  [ ]BRBPR    [  ]epistaxis  [  ]rash    [ ]lower extremity edema        [x ] All others negative	  [ ] Unable to obtain      LABS:	 	    CARDIAC MARKERS:    Troponin T, High Sensitivity Result: 15, 15                        12.5   8.08  )-----------( 202      ( 18 Mar 2024 05:24 )             38.5     Hb Trend: 12.5<--, 13.3<--    03-18    140  |  109<H>  |  16  ----------------------------<  95  4.1   |  21<L>  |  1.08    Ca    8.4      18 Mar 2024 05:24  Phos  4.2     03-17  Mg     2.10     03-17    TPro  7.0  /  Alb  3.5  /  TBili  0.7  /  DBili  x   /  AST  31  /  ALT  28  /  AlkPhos  81  03-18    Creatinine Trend: 1.08<--, 0.89<--, 0.90<--    Coags:  PT/INR - ( 18 Mar 2024 05:24 )   PT: 12.5 sec;   INR: 1.12 ratio      PTT - ( 18 Mar 2024 05:24 )  PTT:32.0 sec    PHYSICAL EXAM:  T(C): 36.8 (03-18-24 @ 10:53), Max: 36.8 (03-17-24 @ 17:53)  HR: 71 (03-18-24 @ 10:53) (64 - 101)  BP: 132/74 (03-18-24 @ 10:53) (104/60 - 132/74)  RR: 19 (03-18-24 @ 10:53) (16 - 21)  SpO2: 98% (03-18-24 @ 10:53) (97% - 99%)    Gen: Appears well in NAD  HEENT:  (-)icterus (-)pallor  CV: N S1 S2 1/6 KERRY (+)2 Pulses B/l  Resp:  Clear to ausculatation B/L, normal effort  GI: (+) BS Soft, NT, ND  Lymph:  (-)Edema, (-)obvious lymphadenopathy  Skin: Warm to touch, Normal turgor  Psych: Appropriate mood and affect      TELEMETRY: 	  SR    ECG:  	Afib with RVR    < from: CT Angio Chest Aorta w/wo IV Cont (03.17.24 @ 17:12) >  IMPRESSION: No intramural hematoma or aortic dissection.    < end of copied text >    < from: US Abdomen Complete (US Abdomen Complete .) (03.18.24 @ 10:58) >  IMPRESSION:  Fatty infiltration of the liver.  2.1 cm left renal cyst.    < end of copied text >        < from: TTE W or WO Ultrasound Enhancing Agent (03.18.24 @ 11:08) >     CONCLUSIONS:   1. Left ventricular cavity is normal in size. Left ventricular wall thickness is normal. Left ventricular systolic function is normal with an ejection fraction of 72 % by Patel's method of disks. There are no regional wall motion abnormalities seen.   2. Normal right ventricular cavity size and normal systolic function.   3. Structurally normal mitral valve with normal leaflet excursion. There is calcification of the mitral valve annulus. There is trace mitral regurgitation.   4. The aortic valve appears trileaflet with normal systolic excursion. There is calcification of the aortic valve leaflets. There is mild aortic regurgitation.    < end of copied text >    ASSESSMENT/PLAN: 	73 year old M with history of emphysema PUD, HLD, BPH presents to the ED with left sided chest pain and associated shortness of breath for the past 1 day    --ACs ruled out  --TTE with Normal LV function  --check NST  --given new PAF, pt with elevated [id number], recommend lifelong AC.  Start Eliquis 5 BID    Plan discussed with patient and daughter who refuse to stay in the hospital for further testing.  She wishes to take her dad out of the hospital AMA due to a trip planned tomorrow.  Risks of leaving AMA including but not limited to MI, stroke and death were explained, and she understands risks of refusal to stay in the hospital for work up and treatment.    Elisa HARRY  [phone]                        HISTORY OF PRESENT ILLNESS: HPI:  Mr. Burden is a 73 year old M with history of emphysema PUD, HLD, BPH presents to the ED with left sided chest pain and associated shortness of breath for the past 1 day, started night of 03/16/2024. He reports left sided chest pain that radiates to the back, needle like in quality, associated with shortness of breath and worsens with exertion. He reports it started last night. He reports last week he took a 4 day course of azithromycin. He also reports at baseline he has YOUNGBLOOD secondary to his emphysema and also reports 2 days ago he was started on a new inhaler trelegy by his pulmonologist. He reports intermittent orthopnea. He denies any associated fever, chills, headache, dizziness, nausea, vomiting, abdominal pain, diaphoresis, numbness, LE edema, recent travel or illness. His daughter in law reports he had some abdominal pain/"feels like gas: prior to the start of his left chest pain. He also has been more constipated over the last few days. CTA abdomen and pelvis shows no intraluminal hematoma or AD, hepatic steatosis, cardiomegaly, no pericardial effusions, calcified granulomas. Labs were sig for the following: , Ca 8.3, BUN/Cr 12/0.89, AST/ALT 71/36 and trop 15->15 and trop 260. EKG as interpreted by me shows the following: , afib with RVR, QRS 86 ms, QTc 441 ms, no ST/T changes.       He also has been taking nystatin suspension BID for the last 4 days and has 6 more days of treatment.     Daughter reports hx of GIBleed 1 year ago with NORMAL EGD And colon at a hospital in [place], no reports available, no recurrent bleed since.    PAST MEDICAL & SURGICAL HISTORY:  PUD (peptic ulcer disease)      HLD (hyperlipidemia)      BPH (benign prostatic hyperplasia)      Emphysema/COPD      No significant past surgical history      MEDICATIONS  (STANDING):  albuterol/ipratropium for Nebulization 3 milliLiter(s) Nebulizer every 6 hours  atorvastatin 10 milliGRAM(s) Oral at bedtime  budesonide 160 MICROgram(s)/formoterol 4.5 MICROgram(s) Inhaler 2 Puff(s) Inhalation two times a day  heparin   Injectable 5000 Unit(s) SubCutaneous every 8 hours  nystatin    Suspension [id number] Unit(s) Oral two times a day  pantoprazole    Tablet 40 milliGRAM(s) Oral before breakfast      Allergies  No Known Allergies    FAMILY HISTORY:  No pertinent family history in first degree relatives      Non-contributary for premature coronary disease or sudden cardiac death    SOCIAL HISTORY:    [ x] Non-smoker  [ ] Smoker  [ ] Alcohol    FLU VACCINE THIS YEAR STARTS IN AUGUST:  [ ] Yes    [ ] No    IF OVER 65 HAVE YOU EVER HAD A PNA VACCINE:  [ ] Yes    [ ] No       [ ] N/A      REVIEW OF SYSTEMS:  [ ]chest pain  [  ]shortness of breath  [  ]palpitations  [  ]syncope  [ ]near syncope [ ]upper extremity weakness   [ ] lower extremity weakness  [  ]diplopia  [  ]altered mental status   [  ]fevers  [ ]chills [ ]nausea  [ ]vomiting  [  ]dysphagia    [ ]abdominal pain  [ ]melena  [ ]BRBPR    [  ]epistaxis  [  ]rash    [ ]lower extremity edema        [x ] All others negative	  [ ] Unable to obtain      LABS:	 	    CARDIAC MARKERS:    Troponin T, High Sensitivity Result: 15, 15                        12.5   8.08  )-----------( 202      ( 18 Mar 2024 05:24 )             38.5     Hb Trend: 12.5<--, 13.3<--    03-18    140  |  109<H>  |  16  ----------------------------<  95  4.1   |  21<L>  |  1.08    Ca    8.4      18 Mar 2024 05:24  Phos  4.2     03-17  Mg     2.10     03-17    TPro  7.0  /  Alb  3.5  /  TBili  0.7  /  DBili  x   /  AST  31  /  ALT  28  /  AlkPhos  81  03-18    Creatinine Trend: 1.08<--, 0.89<--, 0.90<--    Coags:  PT/INR - ( 18 Mar 2024 05:24 )   PT: 12.5 sec;   INR: 1.12 ratio      PTT - ( 18 Mar 2024 05:24 )  PTT:32.0 sec    PHYSICAL EXAM:  T(C): 36.8 (03-18-24 @ 10:53), Max: 36.8 (03-17-24 @ 17:53)  HR: 71 (03-18-24 @ 10:53) (64 - 101)  BP: 132/74 (03-18-24 @ 10:53) (104/60 - 132/74)  RR: 19 (03-18-24 @ 10:53) (16 - 21)  SpO2: 98% (03-18-24 @ 10:53) (97% - 99%)    Gen: Appears well in NAD  HEENT:  (-)icterus (-)pallor  CV: N S1 S2 1/6 KERRY (+)2 Pulses B/l  Resp:  Clear to ausculatation B/L, normal effort  GI: (+) BS Soft, NT, ND  Lymph:  (-)Edema, (-)obvious lymphadenopathy  Skin: Warm to touch, Normal turgor  Psych: Appropriate mood and affect      TELEMETRY: 	  SR    ECG:  	Afib with RVR    < from: CT Angio Chest Aorta w/wo IV Cont (03.17.24 @ 17:12) >  IMPRESSION: No intramural hematoma or aortic dissection.    < end of copied text >    < from: US Abdomen Complete (US Abdomen Complete .) (03.18.24 @ 10:58) >  IMPRESSION:  Fatty infiltration of the liver.  2.1 cm left renal cyst.    < end of copied text >        < from: TTE W or WO Ultrasound Enhancing Agent (03.18.24 @ 11:08) >     CONCLUSIONS:   1. Left ventricular cavity is normal in size. Left ventricular wall thickness is normal. Left ventricular systolic function is normal with an ejection fraction of 72 % by Patel's method of disks. There are no regional wall motion abnormalities seen.   2. Normal right ventricular cavity size and normal systolic function.   3. Structurally normal mitral valve with normal leaflet excursion. There is calcification of the mitral valve annulus. There is trace mitral regurgitation.   4. The aortic valve appears trileaflet with normal systolic excursion. There is calcification of the aortic valve leaflets. There is mild aortic regurgitation.    < end of copied text >    ASSESSMENT/PLAN: 	73 year old M with history of emphysema PUD, HLD, BPH presents to the ED with left sided chest pain and associated shortness of breath for the past 1 day    --ACs ruled out  --TTE with Normal LV function  --check NST  --given new PAF, pt with elevated [id number], recommend lifelong AC.  Start Eliquis 5 BID    Plan discussed with patient and daughter who refuse to stay in the hospital for further testing.  She wishes to take her dad out of the hospital AMA due to a trip planned tomorrow.  Risks of leaving AMA including but not limited to MI, stroke and death were explained, and she understands risks of refusal to stay in the hospital for work up and treatment.    Elisa HARRY  [phone]